# Patient Record
Sex: MALE | Race: ASIAN | NOT HISPANIC OR LATINO
[De-identification: names, ages, dates, MRNs, and addresses within clinical notes are randomized per-mention and may not be internally consistent; named-entity substitution may affect disease eponyms.]

---

## 2023-06-29 ENCOUNTER — APPOINTMENT (OUTPATIENT)
Dept: INTERNAL MEDICINE | Facility: CLINIC | Age: 53
End: 2023-06-29
Payer: MEDICAID

## 2023-06-29 ENCOUNTER — NON-APPOINTMENT (OUTPATIENT)
Age: 53
End: 2023-06-29

## 2023-06-29 VITALS
WEIGHT: 180 LBS | OXYGEN SATURATION: 98 % | SYSTOLIC BLOOD PRESSURE: 168 MMHG | HEART RATE: 74 BPM | DIASTOLIC BLOOD PRESSURE: 84 MMHG | HEIGHT: 68 IN | BODY MASS INDEX: 27.28 KG/M2 | TEMPERATURE: 96 F

## 2023-06-29 DIAGNOSIS — H52.10 MYOPIA, UNSPECIFIED EYE: ICD-10-CM

## 2023-06-29 DIAGNOSIS — E78.5 HYPERLIPIDEMIA, UNSPECIFIED: ICD-10-CM

## 2023-06-29 DIAGNOSIS — Z85.038 PERSONAL HISTORY OF OTHER MALIGNANT NEOPLASM OF LARGE INTESTINE: ICD-10-CM

## 2023-06-29 DIAGNOSIS — R93.3 ABNORMAL FINDINGS ON DIAGNOSTIC IMAGING OF OTHER PARTS OF DIGESTIVE TRACT: ICD-10-CM

## 2023-06-29 DIAGNOSIS — I10 ESSENTIAL (PRIMARY) HYPERTENSION: ICD-10-CM

## 2023-06-29 DIAGNOSIS — Z80.0 FAMILY HISTORY OF MALIGNANT NEOPLASM OF DIGESTIVE ORGANS: ICD-10-CM

## 2023-06-29 DIAGNOSIS — Z00.00 ENCOUNTER FOR GENERAL ADULT MEDICAL EXAMINATION W/OUT ABNORMAL FINDINGS: ICD-10-CM

## 2023-06-29 PROCEDURE — 99386 PREV VISIT NEW AGE 40-64: CPT | Mod: 25

## 2023-06-29 PROCEDURE — 93000 ELECTROCARDIOGRAM COMPLETE: CPT

## 2023-06-29 PROCEDURE — 99203 OFFICE O/P NEW LOW 30 MIN: CPT | Mod: 25

## 2023-06-29 RX ORDER — ATORVASTATIN CALCIUM 20 MG/1
20 TABLET, FILM COATED ORAL
Qty: 1 | Refills: 1 | Status: ACTIVE | COMMUNITY
Start: 2023-06-29 | End: 1900-01-01

## 2023-06-29 NOTE — INTERPRETER SERVICES
[[employer] ] : provided by [employer]   [Time Spent: ____ minutes] : Total time spent using  services: [unfilled] minutes. The patient's primary language is not English thus required  services. [Interpreters_IDNumber] : [id number] [Interpreters_FullName] : Colette [TWNoteComboBox1] : Chinese

## 2023-06-29 NOTE — ASSESSMENT
[FreeTextEntry1] : #HCM\par - discussed healthy diet and exercise\par - discussed age appropriate cancer screenings and vaccines\par - declines Tdap or COVID booster\par - labs and ECG today\par - declines STI screening

## 2023-06-29 NOTE — HEALTH RISK ASSESSMENT
[No] : In the past 12 months have you used drugs other than those required for medical reasons? No [Former] : Former [> 15 Years] : > 15 Years [0] : 2) Feeling down, depressed, or hopeless: Not at all (0) [PHQ-2 Negative - No further assessment needed] : PHQ-2 Negative - No further assessment needed [Patient reported colonoscopy was abnormal] : Patient reported colonoscopy was abnormal [Fully functional (bathing, dressing, toileting, transferring, walking, feeding)] : Fully functional (bathing, dressing, toileting, transferring, walking, feeding) [Fully functional (using the telephone, shopping, preparing meals, housekeeping, doing laundry, using] : Fully functional and needs no help or supervision to perform IADLs (using the telephone, shopping, preparing meals, housekeeping, doing laundry, using transportation, managing medications and managing finances) [de-identified] : no regular exercise  [de-identified] : healthy diet  [[id number]] : 0 [Reports changes in hearing] : Reports no changes in hearing [Reports changes in vision] : Reports no changes in vision [Reports changes in dental health] : Reports no changes in dental health [ColonoscopyDate] : 01/23

## 2023-06-29 NOTE — HISTORY OF PRESENT ILLNESS
[de-identified] : Mr. MAXIM LUCIA is a 53 year old male with history of HTN, HLD, and colon cancer (15 y/a s/p chemo and surgery) presenting today for CPE. He speaks English, but has difficulty with medical terminology. Mandarin  used for the encounter. He splits time between [place] and [place]. He saw a gastroenterologist January of this year for colonoscopy in [place]. Three polyps were found on the colonoscopy and one had abnormal pathology. He did not bring the report today, but states his wife googled the results and suspects he had stage zero colon cancer. He was told he needed repeat colonoscopy.

## 2023-06-29 NOTE — PHYSICAL EXAM
[No Edema] : there was no peripheral edema [Normal] : affect was normal and insight and judgment were intact [de-identified] : +laparotomy scar w/ keloid

## 2023-06-30 ENCOUNTER — NON-APPOINTMENT (OUTPATIENT)
Age: 53
End: 2023-06-30

## 2023-06-30 LAB
ALBUMIN SERPL ELPH-MCNC: 4.8 G/DL
ALP BLD-CCNC: 99 U/L
ALT SERPL-CCNC: 26 U/L
ANION GAP SERPL CALC-SCNC: 17 MMOL/L
AST SERPL-CCNC: 20 U/L
BILIRUB SERPL-MCNC: 0.3 MG/DL
BUN SERPL-MCNC: 12 MG/DL
CALCIUM SERPL-MCNC: 9 MG/DL
CHLORIDE SERPL-SCNC: 102 MMOL/L
CHOLEST SERPL-MCNC: 283 MG/DL
CO2 SERPL-SCNC: 22 MMOL/L
CREAT SERPL-MCNC: 0.92 MG/DL
EGFR: 98 ML/MIN/1.73M2
ESTIMATED AVERAGE GLUCOSE: 108 MG/DL
GLUCOSE SERPL-MCNC: 118 MG/DL
HBA1C MFR BLD HPLC: 5.4 %
HDLC SERPL-MCNC: 62 MG/DL
LDLC SERPL CALC-MCNC: 193 MG/DL
NONHDLC SERPL-MCNC: 221 MG/DL
POTASSIUM SERPL-SCNC: 3.4 MMOL/L
PROT SERPL-MCNC: 7.7 G/DL
SODIUM SERPL-SCNC: 142 MMOL/L
TRIGL SERPL-MCNC: 143 MG/DL

## 2023-10-09 ENCOUNTER — RX RENEWAL (OUTPATIENT)
Age: 53
End: 2023-10-09

## 2023-12-08 ENCOUNTER — EMERGENCY (EMERGENCY)
Facility: HOSPITAL | Age: 53
LOS: 1 days | Discharge: HOME | End: 2023-12-08
Attending: STUDENT IN AN ORGANIZED HEALTH CARE EDUCATION/TRAINING PROGRAM | Admitting: STUDENT IN AN ORGANIZED HEALTH CARE EDUCATION/TRAINING PROGRAM
Payer: MEDICAID

## 2023-12-08 VITALS
DIASTOLIC BLOOD PRESSURE: 85 MMHG | TEMPERATURE: 99 F | SYSTOLIC BLOOD PRESSURE: 154 MMHG | OXYGEN SATURATION: 97 % | RESPIRATION RATE: 18 BRPM | WEIGHT: 176.37 LBS | HEIGHT: 68.11 IN | HEART RATE: 57 BPM

## 2023-12-08 VITALS
DIASTOLIC BLOOD PRESSURE: 81 MMHG | OXYGEN SATURATION: 97 % | SYSTOLIC BLOOD PRESSURE: 145 MMHG | RESPIRATION RATE: 16 BRPM | TEMPERATURE: 98 F | HEART RATE: 60 BPM

## 2023-12-08 PROCEDURE — 73130 X-RAY EXAM OF HAND: CPT | Mod: 26,LT

## 2023-12-08 PROCEDURE — 11740 EVACUATION SUBUNGUAL HMTMA: CPT

## 2023-12-08 PROCEDURE — 99284 EMERGENCY DEPT VISIT MOD MDM: CPT | Mod: 25

## 2023-12-08 RX ORDER — AMLODIPINE AND ATORVASTATIN 5; 20 MG/1; MG/1
1 TABLET, FILM COATED ORAL
Refills: 0 | DISCHARGE

## 2023-12-08 RX ORDER — TETANUS TOXOID, REDUCED DIPHTHERIA TOXOID AND ACELLULAR PERTUSSIS VACCINE, ADSORBED 5; 2.5; 8; 8; 2.5 [IU]/.5ML; [IU]/.5ML; UG/.5ML; UG/.5ML; UG/.5ML
0.5 SUSPENSION INTRAMUSCULAR ONCE
Refills: 0 | Status: COMPLETED | OUTPATIENT
Start: 2023-12-08 | End: 2023-12-08

## 2023-12-08 RX ADMIN — TETANUS TOXOID, REDUCED DIPHTHERIA TOXOID AND ACELLULAR PERTUSSIS VACCINE, ADSORBED 0.5 MILLILITER(S): 5; 2.5; 8; 8; 2.5 SUSPENSION INTRAMUSCULAR at 22:53

## 2023-12-08 NOTE — ED PROVIDER NOTE - PATIENT PORTAL LINK FT
You can access the FollowMyHealth Patient Portal offered by [facility] by registering at the following website: [email or web address]. By joining [facility]’s FollowMyHealth portal, you will also be able to view your health information using other applications (apps) compatible with our system. You can access the FollowMyHealth Patient Portal offered by [facility] by registering at the following website: [email or web address]. By joining [facility]’s FollowMyHealth portal, you will also be able to view your health information using other applications (apps) compatible with our system.

## 2023-12-08 NOTE — ED PROVIDER NOTE - CLINICAL SUMMARY MEDICAL DECISION MAKING FREE TEXT BOX
53 year old male p/w left 5th finger injury that occurred after slamming it into a car door this morning.  Hematoma present under the nail bed.  X-ray, trephination with electric cautery, update tetanus, sterile dressing

## 2023-12-08 NOTE — ED PROVIDER NOTE - NSFOLLOWUPINSTRUCTIONS_ED_ALL_ED_FT
Please keep the area clean and dry.  Return to the ER for persistent pain, bleeding, swelling, finger redness, fever, numbness, or any other concerns.     Subungual Hematoma    A subungual hematoma, sometimes called runner's toe or tennis toe, is a collection of blood under a fingernail or toenail. It can cause pain and a dark blue area under the nail.    What are the causes?  This condition is caused by an injury to a finger or toe that breaks a blood vessel beneath the nail. It can develop from:    A hard, direct hit to a finger or toe (crush injury).  Pressure being repeatedly put on a finger or toe, such as pressure on a toe from running or playing tennis.    What are the signs or symptoms?     Symptoms of this condition include:    A blue or dark blue color under the nail.  Pain or throbbing in the injured area.    How is this diagnosed?  This condition is diagnosed with a medical history and a physical exam.    X-rays may be done to check for damage to the surrounding bones and tissues.    How is this treated?  Usually, treatment is not needed for this condition. The pain often goes away in a few days, and the dark color under the nail will go away as the nail grows.    If the injury is more severe, your health care provider may:    Perform a painless procedure to drain the blood from beneath the nail. This may be done if the condition is causing a lot of pain or if a lot of blood collects under the fingernail or toenail.  Remove the nail. This may be needed if there is a cut under the nail that requires stitches (sutures).    Follow these instructions at home:      Managing pain, stiffness, and swelling     If directed, apply ice to the injured area:    Put ice in a plastic bag.  Place a towel between your skin and the bag.  Leave the ice on for 20 minutes, 2–3 times a day.  Raise (elevate) the injured finger or toe above the level of your heart while you are sitting or lying down. This will help to decrease pain and swelling.        Injury care    Follow instructions from your health care provider about how to take care of your injury. Make sure you:    Change any bandage (dressing) as told by your health care provider.  Wash your hands with soap and water before you change your dressing. If soap and water are not available, use hand .  Leave stitches (sutures) in place. You may have these if your health care provider repaired a cut under the nail. The sutures may need to stay in place for 2 weeks or longer.  If part of your nail falls off, gently trim the remaining nail. This prevents the remaining nail from catching on something and causing further injury.        General instructions    Take over-the-counter and prescription medicines only as told by your health care provider.  Return to your normal activities as told by your health care provider. Ask your health care provider what activities are safe for you.  Keep all follow-up visits as told by your health care provider. This is important.    Contact a health care provider if you have:  Pain that is not controlled with medicine.  A fever.  Redness, swelling, or pain around your nail.    Get help right away if you have:  Fluid, blood, or pus coming from your nail.    Summary  A subungual hematoma is a collection of blood under a fingernail or toenail.  This condition is typically caused by a crush injury or an injury from repeatedly putting stress on a finger or toe.  The condition causes pain and a dark blue area under the nail.  A subungual hematoma will usually go away on its own.  In some cases, a health care provider may drain the blood from underneath the nail.    ADDITIONAL NOTES AND INSTRUCTIONS    Please follow up with your Primary MD in 24-48 hr.  Seek immediate medical care for any new/worsening signs or symptoms.

## 2023-12-08 NOTE — ED PROVIDER NOTE - OBJECTIVE STATEMENT
53-year-old male with a history of HTN presents with left fifth finger injury.  Patient states that this morning he accidentally slammed his pinky finger into a car door.  He had significant pain to his nailbed region at the time and he applied ice immediately.  As the day progressed he noted swelling to the tip of his finger as well as a hematoma under his nailbed.  He took no medication for the pain PTA.  Patient is right-hand dominant.  He denies any other complaints or any other injuries.  He has been moving his finger with no distress. Unknown last tetanus. His primary care doctor is in [facility]. 53-year-old male with a history of HTN presents with left fifth finger injury.  Patient states that this morning he accidentally slammed his pinky finger into a car door.  He had significant pain to his nailbed region at the time and he applied ice immediately.  As the day progressed he noted swelling to the tip of his finger as well as a hematoma under his nailbed.  He took no medication for the pain PTA.  Patient is right-hand dominant.  He denies any other complaints or any other injuries.  He has been moving his finger with no distress. Unknown last tetanus. His primary care doctor is in [facility].

## 2023-12-08 NOTE — ED PROVIDER NOTE - MUSCULOSKELETAL, MLM
Spine appears normal, range of motion is not limited, no muscle or joint tenderness.  Left 5th finger with subungual hematoma.  Full ROM of 5th finger, no laceration, ecchymosis, edema, erythema.  Non-tender to palpation

## 2023-12-08 NOTE — ED ADULT TRIAGE NOTE - CHIEF COMPLAINT QUOTE
Cc/o pain to left 5th digit since this a.m. s/p "slammed my finger in car door.". (+) hematoma under nail, (+) ROM.

## 2023-12-08 NOTE — ED PROVIDER NOTE - DIFFERENTIAL DIAGNOSIS
Ddx includes but not limited to subungual hematoma, fracture, dislocation, splinter hemorrhage, nailbed infection Differential Diagnosis

## 2023-12-08 NOTE — ED ADULT NURSE NOTE - OBJECTIVE STATEMENT
pt presented with left 5th finger pain and swelling, reports his finger jammed  on the car door this morning, since then pain is progressively worsening.

## 2023-12-08 NOTE — ED ADULT NURSE NOTE - NSFALLUNIVINTERV_ED_ALL_ED
Bed/Stretcher in lowest position, wheels locked, appropriate side rails in place/Call bell, personal items and telephone in reach/Instruct patient to call for assistance before getting out of bed/chair/stretcher/Non-slip footwear applied when patient is off stretcher/[place] to call system/Physically safe environment - no spills, clutter or unnecessary equipment/Purposeful proactive rounding/Room/bathroom lighting operational, light cord in reach Bed/Stretcher in lowest position, wheels locked, appropriate side rails in place/Call bell, personal items and telephone in reach/Instruct patient to call for assistance before getting out of bed/chair/stretcher/Non-slip footwear applied when patient is off stretcher/[place] to call system/Physically safe environment - no spills, clutter or unnecessary equipment/Purposeful proactive rounding/Room/bathroom lighting operational, light cord in reach

## 2023-12-09 PROCEDURE — 10061 I&D ABSCESS COMP/MULTIPLE: CPT

## 2023-12-09 PROCEDURE — 99283 EMERGENCY DEPT VISIT LOW MDM: CPT | Mod: 25

## 2023-12-09 PROCEDURE — 90715 TDAP VACCINE 7 YRS/> IM: CPT

## 2023-12-09 PROCEDURE — 73130 X-RAY EXAM OF HAND: CPT

## 2023-12-09 PROCEDURE — 90471 IMMUNIZATION ADMIN: CPT

## 2023-12-09 NOTE — ED PROCEDURE NOTE - PROCEDURE ADDITIONAL DETAILS
trephination with electric cautery.  Significant blood release.  Patient tolerated well, +pain relief

## 2023-12-28 RX ORDER — AMLODIPINE BESYLATE AND BENAZEPRIL HYDROCHLORIDE 10; 40 MG/1; MG/1
10-40 CAPSULE ORAL
Qty: 90 | Refills: 1 | Status: ACTIVE | COMMUNITY
Start: 2023-06-29 | End: 1900-01-01

## 2024-10-17 ENCOUNTER — APPOINTMENT (OUTPATIENT)
Dept: INTERNAL MEDICINE | Facility: CLINIC | Age: 54
End: 2024-10-17

## 2024-12-24 ENCOUNTER — RX RENEWAL (OUTPATIENT)
Age: 54
End: 2024-12-24

## 2025-02-11 ENCOUNTER — APPOINTMENT (OUTPATIENT)
Dept: INTERNAL MEDICINE | Facility: CLINIC | Age: 55
End: 2025-02-11
Payer: MEDICAID

## 2025-02-11 VITALS
DIASTOLIC BLOOD PRESSURE: 87 MMHG | OXYGEN SATURATION: 94 % | HEIGHT: 68 IN | RESPIRATION RATE: 12 BRPM | BODY MASS INDEX: 27.89 KG/M2 | HEART RATE: 69 BPM | SYSTOLIC BLOOD PRESSURE: 132 MMHG | WEIGHT: 184 LBS

## 2025-02-11 VITALS — SYSTOLIC BLOOD PRESSURE: 120 MMHG | DIASTOLIC BLOOD PRESSURE: 80 MMHG

## 2025-02-11 DIAGNOSIS — Z85.038 PERSONAL HISTORY OF OTHER MALIGNANT NEOPLASM OF LARGE INTESTINE: ICD-10-CM

## 2025-02-11 DIAGNOSIS — I10 ESSENTIAL (PRIMARY) HYPERTENSION: ICD-10-CM

## 2025-02-11 DIAGNOSIS — E78.5 HYPERLIPIDEMIA, UNSPECIFIED: ICD-10-CM

## 2025-02-11 DIAGNOSIS — Z00.00 ENCOUNTER FOR GENERAL ADULT MEDICAL EXAMINATION W/OUT ABNORMAL FINDINGS: ICD-10-CM

## 2025-02-11 PROCEDURE — 99396 PREV VISIT EST AGE 40-64: CPT | Mod: 25

## 2025-02-11 PROCEDURE — 93000 ELECTROCARDIOGRAM COMPLETE: CPT

## 2025-02-11 PROCEDURE — 99213 OFFICE O/P EST LOW 20 MIN: CPT | Mod: 25

## 2025-02-12 ENCOUNTER — LABORATORY RESULT (OUTPATIENT)
Age: 55
End: 2025-02-12

## 2025-05-23 ENCOUNTER — RX RENEWAL (OUTPATIENT)
Age: 55
End: 2025-05-23

## 2025-06-03 ENCOUNTER — RX RENEWAL (OUTPATIENT)
Age: 55
End: 2025-06-03